# Patient Record
Sex: FEMALE | Race: WHITE | NOT HISPANIC OR LATINO | Employment: FULL TIME | URBAN - METROPOLITAN AREA
[De-identification: names, ages, dates, MRNs, and addresses within clinical notes are randomized per-mention and may not be internally consistent; named-entity substitution may affect disease eponyms.]

---

## 2019-12-03 ENCOUNTER — CLINICAL SUPPORT (OUTPATIENT)
Dept: GASTROENTEROLOGY | Facility: CLINIC | Age: 64
End: 2019-12-03

## 2019-12-03 VITALS — BODY MASS INDEX: 27.6 KG/M2 | WEIGHT: 150 LBS | HEIGHT: 62 IN

## 2019-12-03 DIAGNOSIS — Z86.010 HISTORY OF COLONIC POLYPS: Primary | ICD-10-CM

## 2019-12-03 RX ORDER — FLUTICASONE PROPIONATE 50 MCG
1 SPRAY, SUSPENSION (ML) NASAL DAILY
COMMUNITY

## 2019-12-03 RX ORDER — ASPIRIN 81 MG/1
81 TABLET ORAL DAILY
COMMUNITY

## 2019-12-03 NOTE — PROGRESS NOTES
Pt seen for colon prep dx history of polyps med's reviewed instructions given for clenpiq sample given to patient

## 2019-12-05 ENCOUNTER — TELEPHONE (OUTPATIENT)
Dept: GASTROENTEROLOGY | Facility: CLINIC | Age: 64
End: 2019-12-05

## 2019-12-05 NOTE — TELEPHONE ENCOUNTER
Received call from pt pharmacy  Clenpiq not available still  Called in Plenvu $0 copay  LVM for pt that we called in a new prep kit for her  Asked her to call us back to review new instructions and to find out if it is ok to e-mail her the new prep instructions

## 2019-12-09 PROCEDURE — 88305 TISSUE EXAM BY PATHOLOGIST: CPT | Performed by: PATHOLOGY

## 2019-12-10 ENCOUNTER — LAB REQUISITION (OUTPATIENT)
Dept: LAB | Facility: HOSPITAL | Age: 64
End: 2019-12-10
Payer: COMMERCIAL

## 2019-12-10 DIAGNOSIS — Z83.71 FAMILY HISTORY OF COLONIC POLYPS: ICD-10-CM

## 2019-12-10 DIAGNOSIS — Z12.11 ENCOUNTER FOR SCREENING FOR MALIGNANT NEOPLASM OF COLON: ICD-10-CM

## 2019-12-10 DIAGNOSIS — K62.1 RECTAL POLYP: ICD-10-CM

## 2019-12-10 DIAGNOSIS — K64.0 FIRST DEGREE HEMORRHOIDS: ICD-10-CM

## 2019-12-10 DIAGNOSIS — K57.30 DIVERTICULOSIS OF LARGE INTESTINE WITHOUT PERFORATION OR ABSCESS WITHOUT BLEEDING: ICD-10-CM

## 2020-07-23 ENCOUNTER — TELEPHONE (OUTPATIENT)
Dept: OBGYN CLINIC | Facility: CLINIC | Age: 65
End: 2020-07-23

## 2020-07-27 ENCOUNTER — OFFICE VISIT (OUTPATIENT)
Dept: OBGYN CLINIC | Facility: CLINIC | Age: 65
End: 2020-07-27
Payer: COMMERCIAL

## 2020-07-27 VITALS — BODY MASS INDEX: 28.58 KG/M2 | HEIGHT: 61 IN

## 2020-07-27 DIAGNOSIS — R93.89 THICKENED ENDOMETRIUM: Primary | ICD-10-CM

## 2020-07-27 DIAGNOSIS — Z12.31 ENCOUNTER FOR SCREENING MAMMOGRAM FOR MALIGNANT NEOPLASM OF BREAST: ICD-10-CM

## 2020-07-27 DIAGNOSIS — N88.2 STENOTIC CERVICAL OS: ICD-10-CM

## 2020-07-27 DIAGNOSIS — M81.0 AGE-RELATED OSTEOPOROSIS WITHOUT CURRENT PATHOLOGICAL FRACTURE: ICD-10-CM

## 2020-07-27 PROCEDURE — 99203 OFFICE O/P NEW LOW 30 MIN: CPT | Performed by: OBSTETRICS & GYNECOLOGY

## 2020-07-27 RX ORDER — ACETAMINOPHEN 160 MG
TABLET,DISINTEGRATING ORAL EVERY 24 HOURS
COMMUNITY

## 2020-07-27 NOTE — PROGRESS NOTES
CC:  Endometrial thickening    HPI: Malina Kraft presents for evaluation of endometrial thickening  This patient who was seen at another gyn office several months ago was found to have endometrial thickening  She underwent an endometrial biopsy with only cervical tissue obtained  The physician there recommended a formal D&C but the patient did not wish to undergo surgery  Instead, a subtle upon a repeat biopsy 6 months later  The patient is now here for this biopsy since her previous gyn is no longer available  Historically, the patient had 1 prior D&C and apparently a LEEP cone procedure  She had a Pap smear last year that was normal and she denies any active spotting or bleeding at this time  Past Medical History:  No past medical history on file  Past Surgical History:  Past Surgical History:   Procedure Laterality Date     SECTION      CHOLECYSTECTOMY      COLONOSCOPY  2014    DILATION AND CURETTAGE OF UTERUS      TUBAL LIGATION         Past OB/Gyn History:  Refer to HPI     ALLERGIES: No Known Allergies    MEDS:   Current Outpatient Medications:     aspirin (ECOTRIN LOW STRENGTH) 81 mg EC tablet    Cholecalciferol (VITAMIN D3) 50 MCG ( UT) capsule    fluticasone (FLONASE) 50 mcg/act nasal spray    Sod Picosulfate-Mag Ox-Cit Acd (CLENPIQ) 10-3 5-12 MG-GM -GM/160ML SOLN    Review of Systems:  Skin: No rashes or discolorations of any concern  RESP: Denies SOB, no cough  CV: Denies chest pain or palpitations  Breasts: Denies masses, pain, skin changes and nipple discharge  GI: Denies abdominal pain, heartburn, nausea, vomiting, changes in bowel habits  : Denies dysuria, frequency, CVA tenderness, incontinence and hematuria  Genitalia: Denies abnormal vaginal discharge, external lesions, rashes, pelvic pain, pressure, abnormal bleeding    Rectal:  Denies pain, bleeding, hemorrhoids,    Physical Exam:  Ht 5' 0 75" (1 543 m)   BMI 28 58 kg/m²    Gen: The patient was alert and oriented x3, pleasant well-appearing female in no acute distress  Heart:  Regular rate and rhythm without murmurs  Lungs:  Clear bilaterally without wheeze, rales or rhonchi  Abd:  Soft, nontender, nondistended, no masses or organomegaly  Back:  No CVA tenderness, no tenderness to palpation along spine  Pelvic  Normal appearing external female genitalia, no visible lesions, no rashes  Vagina is free of discharge, normal vaginal epithelium, no abnormal  lesions, no evidence of prolapse anteriorly or posteriorly  The cervix appears flush with a very stenotic os,, mobile and nontender  Uterus is atrophic in size, mobile and, nontender  No palpable adnexal masses or tenderness  No anoperineal lesions  Skin:  No concerning lesions  Extremeties: No edema      Assessment & Plan:   1  Endometrial thickening with a stenotic os  Advised the patient based on the visual exam that I would not even attempt an office biopsy given the small size of the cervical os and the history of the LEEP cone procedure  I reviewed the process of hysteroscopy and D&C with the patient and she agrees to the same  She had adequate opportunity to have her questions answered  She was personally consented by myself

## 2020-07-29 ENCOUNTER — TELEPHONE (OUTPATIENT)
Dept: OBGYN CLINIC | Facility: CLINIC | Age: 65
End: 2020-07-29

## 2020-07-31 ENCOUNTER — TRANSCRIBE ORDERS (OUTPATIENT)
Dept: ADMINISTRATIVE | Facility: HOSPITAL | Age: 65
End: 2020-07-31

## 2020-07-31 DIAGNOSIS — Z12.31 ENCOUNTER FOR SCREENING MAMMOGRAM FOR MALIGNANT NEOPLASM OF BREAST: Primary | ICD-10-CM

## 2020-08-11 ENCOUNTER — HOSPITAL ENCOUNTER (OUTPATIENT)
Dept: NON INVASIVE DIAGNOSTICS | Facility: CLINIC | Age: 65
Discharge: HOME | End: 2020-08-11
Payer: COMMERCIAL

## 2020-08-11 ENCOUNTER — HOSPITAL ENCOUNTER (OUTPATIENT)
Dept: MAMMOGRAPHY | Facility: CLINIC | Age: 65
Discharge: HOME | End: 2020-08-11
Payer: COMMERCIAL

## 2020-08-11 ENCOUNTER — HOSPITAL ENCOUNTER (OUTPATIENT)
Dept: BONE DENSITY | Facility: CLINIC | Age: 65
Discharge: HOME | End: 2020-08-11
Payer: COMMERCIAL

## 2020-08-11 ENCOUNTER — APPOINTMENT (OUTPATIENT)
Dept: LAB | Facility: CLINIC | Age: 65
End: 2020-08-11
Payer: COMMERCIAL

## 2020-08-11 VITALS — WEIGHT: 150 LBS | BODY MASS INDEX: 28.32 KG/M2 | HEIGHT: 61 IN

## 2020-08-11 DIAGNOSIS — Z01.818 PRE-OP TESTING: ICD-10-CM

## 2020-08-11 DIAGNOSIS — Z12.31 ENCOUNTER FOR SCREENING MAMMOGRAM FOR MALIGNANT NEOPLASM OF BREAST: ICD-10-CM

## 2020-08-11 DIAGNOSIS — M81.0 AGE-RELATED OSTEOPOROSIS WITHOUT CURRENT PATHOLOGICAL FRACTURE: ICD-10-CM

## 2020-08-11 LAB
APTT PPP: 28 SECONDS (ref 23–37)
ATRIAL RATE: 64 BPM
ERYTHROCYTE [DISTWIDTH] IN BLOOD BY AUTOMATED COUNT: 12.9 % (ref 11.6–15.1)
HCT VFR BLD AUTO: 40.8 % (ref 34.8–46.1)
HGB BLD-MCNC: 12.8 G/DL (ref 11.5–15.4)
INR PPP: 0.95 (ref 0.84–1.19)
MCH RBC QN AUTO: 29.9 PG (ref 26.8–34.3)
MCHC RBC AUTO-ENTMCNC: 31.4 G/DL (ref 31.4–37.4)
MCV RBC AUTO: 95 FL (ref 82–98)
P AXIS: 41 DEGREES
PLATELET # BLD AUTO: 280 THOUSANDS/UL (ref 149–390)
PMV BLD AUTO: 10.8 FL (ref 8.9–12.7)
PR INTERVAL: 154 MS
PROTHROMBIN TIME: 12.7 SECONDS (ref 11.6–14.5)
QRS AXIS: 2 DEGREES
QRSD INTERVAL: 80 MS
QT INTERVAL: 424 MS
QTC INTERVAL: 437 MS
RBC # BLD AUTO: 4.28 MILLION/UL (ref 3.81–5.12)
T WAVE AXIS: 6 DEGREES
VENTRICULAR RATE: 64 BPM
WBC # BLD AUTO: 5.88 THOUSAND/UL (ref 4.31–10.16)

## 2020-08-11 PROCEDURE — 85027 COMPLETE CBC AUTOMATED: CPT

## 2020-08-11 PROCEDURE — 36415 COLL VENOUS BLD VENIPUNCTURE: CPT

## 2020-08-11 PROCEDURE — 77067 SCR MAMMO BI INCL CAD: CPT

## 2020-08-11 PROCEDURE — 77080 DXA BONE DENSITY AXIAL: CPT

## 2020-08-11 PROCEDURE — 85730 THROMBOPLASTIN TIME PARTIAL: CPT

## 2020-08-11 PROCEDURE — 93010 ELECTROCARDIOGRAM REPORT: CPT | Performed by: INTERNAL MEDICINE

## 2020-08-11 PROCEDURE — 93005 ELECTROCARDIOGRAM TRACING: CPT

## 2020-08-11 PROCEDURE — 85610 PROTHROMBIN TIME: CPT

## 2020-08-11 PROCEDURE — 77063 BREAST TOMOSYNTHESIS BI: CPT

## 2020-08-21 NOTE — PRE-PROCEDURE INSTRUCTIONS
Pre-Surgery Instructions:   Medication Instructions    Ascorbic Acid (VITAMIN C PO) Instructed patient per Anesthesia Guidelines   aspirin (ECOTRIN LOW STRENGTH) 81 mg EC tablet Patient was instructed by Physician and understands   Cholecalciferol (VITAMIN D3) 50 MCG (2000 UT) capsule Instructed patient per Anesthesia Guidelines   CRANBERRY PO Instructed patient per Anesthesia Guidelines   Cyanocobalamin (VITAMIN B12 PO) Instructed patient per Anesthesia Guidelines   fluticasone (FLONASE) 50 mcg/act nasal spray Instructed patient per Anesthesia Guidelines   Pyridoxine HCl (VITAMIN B6 PO) Instructed patient per Anesthesia Guidelines  Pre-op Showering Instructions for Surgery Patients    Before your operation, you play an important role in decreasing your risk for infection by washing with special antiseptic soap  This is an effective way to reduce bacteria on the skin which may help to prevent infections at the surgical site  Please read the following directions in advance  1  In the week before your operation, purchase a 4 ounce bottle of antiseptic soap containing chlorhexidine gluconate (CHG)  4%  Some brand names include: Aplicare®, Endure, and Hibiclens®  The cost is usually less than $5 00   For your convenience, the [employer] carries the soap   It may also be available at your doctors office or pre-admission testing center, and at most retail pharmacies   If you are allergic or sensitive to soaps containing CHG, please let your doctor know so another antiseptic can be suggested   CHG antiseptic soap is for external use only  2   The day before your operation, follow these instructions carefully to get ready   Please clean linens (sheets) on your bed; you should sleep on clean sheets after your evening shower   Get clean towels and washcloth ready - you need enough for 2 showers   Set aside clean underwear, pajamas, and clothing to wear after the showers     Reminders:   DO NOT use any other soap or body rinse on your skin during or after the antiseptic showers   DO NOT use lotion, powder, deodorant, or perfume/aftershave of any kind on your skin after your antiseptic shower   DO NOT shave any body parts in the 24 hours/day before your operation   DO NOT get the antiseptic soap in your eyes, ears, nose, mouth, or vaginal area    3  You will need to shower the night before AND the morning of your surgery  Shower 1:   The first evening before the operation, take the first shower   First, shampoo your hair with regular shampoo and rinse it completely before you use the antiseptic soap  After washing and rinsing your hair, rinse your body   Next, use a clean washcloth to apply the antiseptic soap and wash your body from the neck down to your toes using ½ bottle of the antiseptic soap  You will use the other ½ bottle for the second shower   Clean the area where your incision will be; lather this area well for about 2 minutes   If you are having head or neck surgery, wash areas with the antiseptic soap   Rinse yourself completely with running water   Use a clean towel to dry off   Wear clean underwear and clothing/pajamas  Shower 2   The morning of your operation, take the second shower following the same steps as Shower 1 using the second ½ of the bottle of antiseptic soap   Use clean cloths and towels to wash and dry yourself   Wear clean underwear and clothing    You will receive a phone call from hospital for arrival time  Please call surgeons office if any changes in your condition  Wear easy on/off clothing; consider type of surgery;  valuables and jewelry please keep at home  **COVID-19  education done  Please: No contacts or eye make up or artificial eyelashes    Please bring special ordered sling or braces if needed for  Your particular surgery   n/a    Please secure transportation     Follow pre surgery showering or cleaning instructions as  Reviewed by nurse or surgeons office      Questions answered and concerns addressed

## 2020-08-31 ENCOUNTER — ANESTHESIA (OUTPATIENT)
Dept: PERIOP | Facility: HOSPITAL | Age: 65
End: 2020-08-31
Payer: COMMERCIAL

## 2020-08-31 ENCOUNTER — HOSPITAL ENCOUNTER (OUTPATIENT)
Facility: HOSPITAL | Age: 65
Setting detail: OUTPATIENT SURGERY
Discharge: HOME | End: 2020-08-31
Attending: OBSTETRICS & GYNECOLOGY | Admitting: OBSTETRICS & GYNECOLOGY
Payer: COMMERCIAL

## 2020-08-31 ENCOUNTER — ANESTHESIA EVENT (OUTPATIENT)
Dept: PERIOP | Facility: HOSPITAL | Age: 65
End: 2020-08-31
Payer: COMMERCIAL

## 2020-08-31 VITALS
BODY MASS INDEX: 27.38 KG/M2 | WEIGHT: 145 LBS | TEMPERATURE: 97.8 F | HEIGHT: 61 IN | HEART RATE: 73 BPM | RESPIRATION RATE: 16 BRPM | DIASTOLIC BLOOD PRESSURE: 64 MMHG | OXYGEN SATURATION: 100 % | SYSTOLIC BLOOD PRESSURE: 122 MMHG

## 2020-08-31 DIAGNOSIS — R93.89 ENDOMETRIAL THICKENING ON ULTRASOUND: ICD-10-CM

## 2020-08-31 PROCEDURE — 88305 TISSUE EXAM BY PATHOLOGIST: CPT | Performed by: PATHOLOGY

## 2020-08-31 PROCEDURE — 58558 HYSTEROSCOPY BIOPSY: CPT | Performed by: OBSTETRICS & GYNECOLOGY

## 2020-08-31 PROCEDURE — 99024 POSTOP FOLLOW-UP VISIT: CPT | Performed by: OBSTETRICS & GYNECOLOGY

## 2020-08-31 RX ORDER — FENTANYL CITRATE/PF 50 MCG/ML
25 SYRINGE (ML) INJECTION
Status: DISCONTINUED | OUTPATIENT
Start: 2020-08-31 | End: 2020-08-31 | Stop reason: HOSPADM

## 2020-08-31 RX ORDER — IBUPROFEN 600 MG/1
600 TABLET ORAL EVERY 6 HOURS PRN
Status: DISCONTINUED | OUTPATIENT
Start: 2020-08-31 | End: 2020-08-31 | Stop reason: HOSPADM

## 2020-08-31 RX ORDER — ONDANSETRON 2 MG/ML
4 INJECTION INTRAMUSCULAR; INTRAVENOUS ONCE AS NEEDED
Status: DISCONTINUED | OUTPATIENT
Start: 2020-08-31 | End: 2020-08-31 | Stop reason: HOSPADM

## 2020-08-31 RX ORDER — MEPERIDINE HYDROCHLORIDE 25 MG/ML
12.5 INJECTION INTRAMUSCULAR; INTRAVENOUS; SUBCUTANEOUS
Status: DISCONTINUED | OUTPATIENT
Start: 2020-08-31 | End: 2020-08-31 | Stop reason: HOSPADM

## 2020-08-31 RX ORDER — FENTANYL CITRATE 50 UG/ML
INJECTION, SOLUTION INTRAMUSCULAR; INTRAVENOUS AS NEEDED
Status: DISCONTINUED | OUTPATIENT
Start: 2020-08-31 | End: 2020-08-31

## 2020-08-31 RX ORDER — SODIUM CHLORIDE, SODIUM LACTATE, POTASSIUM CHLORIDE, CALCIUM CHLORIDE 600; 310; 30; 20 MG/100ML; MG/100ML; MG/100ML; MG/100ML
75 INJECTION, SOLUTION INTRAVENOUS CONTINUOUS
Status: DISCONTINUED | OUTPATIENT
Start: 2020-08-31 | End: 2020-08-31 | Stop reason: HOSPADM

## 2020-08-31 RX ORDER — METOCLOPRAMIDE HYDROCHLORIDE 5 MG/ML
10 INJECTION INTRAMUSCULAR; INTRAVENOUS ONCE AS NEEDED
Status: DISCONTINUED | OUTPATIENT
Start: 2020-08-31 | End: 2020-08-31 | Stop reason: HOSPADM

## 2020-08-31 RX ORDER — HYDROMORPHONE HCL/PF 1 MG/ML
0.5 SYRINGE (ML) INJECTION
Status: DISCONTINUED | OUTPATIENT
Start: 2020-08-31 | End: 2020-08-31 | Stop reason: HOSPADM

## 2020-08-31 RX ORDER — DEXAMETHASONE SODIUM PHOSPHATE 10 MG/ML
INJECTION, SOLUTION INTRAMUSCULAR; INTRAVENOUS AS NEEDED
Status: DISCONTINUED | OUTPATIENT
Start: 2020-08-31 | End: 2020-08-31

## 2020-08-31 RX ORDER — MAGNESIUM HYDROXIDE 1200 MG/15ML
LIQUID ORAL AS NEEDED
Status: DISCONTINUED | OUTPATIENT
Start: 2020-08-31 | End: 2020-08-31 | Stop reason: HOSPADM

## 2020-08-31 RX ORDER — PROPOFOL 10 MG/ML
INJECTION, EMULSION INTRAVENOUS AS NEEDED
Status: DISCONTINUED | OUTPATIENT
Start: 2020-08-31 | End: 2020-08-31

## 2020-08-31 RX ORDER — ONDANSETRON 2 MG/ML
INJECTION INTRAMUSCULAR; INTRAVENOUS AS NEEDED
Status: DISCONTINUED | OUTPATIENT
Start: 2020-08-31 | End: 2020-08-31

## 2020-08-31 RX ORDER — MIDAZOLAM HYDROCHLORIDE 2 MG/2ML
INJECTION, SOLUTION INTRAMUSCULAR; INTRAVENOUS AS NEEDED
Status: DISCONTINUED | OUTPATIENT
Start: 2020-08-31 | End: 2020-08-31

## 2020-08-31 RX ADMIN — PROPOFOL 200 MG: 10 INJECTION, EMULSION INTRAVENOUS at 08:50

## 2020-08-31 RX ADMIN — FENTANYL CITRATE 25 MCG: 50 INJECTION, SOLUTION INTRAMUSCULAR; INTRAVENOUS at 09:25

## 2020-08-31 RX ADMIN — ONDANSETRON 4 MG: 2 INJECTION INTRAMUSCULAR; INTRAVENOUS at 09:05

## 2020-08-31 RX ADMIN — DEXAMETHASONE SODIUM PHOSPHATE 4 MG: 10 INJECTION, SOLUTION INTRAMUSCULAR; INTRAVENOUS at 09:00

## 2020-08-31 RX ADMIN — FENTANYL CITRATE 100 MCG: 50 INJECTION, SOLUTION INTRAMUSCULAR; INTRAVENOUS at 08:43

## 2020-08-31 RX ADMIN — SODIUM CHLORIDE, SODIUM LACTATE, POTASSIUM CHLORIDE, AND CALCIUM CHLORIDE 75 ML/HR: .6; .31; .03; .02 INJECTION, SOLUTION INTRAVENOUS at 07:55

## 2020-08-31 RX ADMIN — FENTANYL CITRATE 25 MCG: 50 INJECTION, SOLUTION INTRAMUSCULAR; INTRAVENOUS at 09:31

## 2020-08-31 RX ADMIN — MIDAZOLAM 2 MG: 1 INJECTION INTRAMUSCULAR; INTRAVENOUS at 08:43

## 2020-08-31 NOTE — H&P
H&P Exam - Gynecology   Roberto Knowles 72 y o  female MRN: [id number]  Unit/Bed#: OR [place] Encounter: [id number]    CC:  Endometrial thickening      History of Present Illness   HPI:  Roberto Knowles is a 72 y o  female who presents with hysteroscopy with D&C because of endometrial thickening  This patient who was seen at another gyn office several months ago was found to have endometrial thickening  She underwent an endometrial biopsy with only cervical tissue obtained  The physician there recommended a formal D&C but the patient did not wish to undergo surgery  Instead, a subtle upon a repeat biopsy 6 months later  The patient is now here for this biopsy since her previous gyn is no longer available  Historically, the patient had 1 prior D&C and apparently a LEEP cone procedure  She had a Pap smear last year that was normal and she denies any active spotting or bleeding at this time  Historical Information   History reviewed  No pertinent past medical history    Past Surgical History:   Procedure Laterality Date     SECTION      CHOLECYSTECTOMY      COLONOSCOPY  2014    DILATION AND CURETTAGE OF UTERUS      TUBAL LIGATION       OB/GYN History:  Noncontributory  Family History   Problem Relation Age of Onset    Hypertension Mother     Heart disease Mother     Thyroid disease Mother     Liver disease Mother     Cancer Father         lung cancer    No Known Problems Daughter     No Known Problems Maternal Grandmother     No Known Problems Maternal Grandfather     No Known Problems Paternal Grandmother     No Known Problems Paternal Grandfather     No Known Problems Maternal Aunt     No Known Problems Maternal Aunt     No Known Problems Paternal Aunt      Social History   Social History     Substance and Sexual Activity   Alcohol Use Yes    Comment: 1-2 glasses per wine     Social History     Substance and Sexual Activity   Drug Use Never     Social History     Tobacco Use   Smoking Status Former Smoker    Types: Cigarettes   Smokeless Tobacco Never Used   Tobacco Comment    quit march 1977       Meds/Allergies   Medications Prior to Admission   Medication    Ascorbic Acid (VITAMIN C PO)    aspirin (ECOTRIN LOW STRENGTH) 81 mg EC tablet    Cholecalciferol (VITAMIN D3) 50 MCG (2000 UT) capsule    CRANBERRY PO    Cyanocobalamin (VITAMIN B12 PO)    fluticasone (FLONASE) 50 mcg/act nasal spray    Pyridoxine HCl (VITAMIN B6 PO)     No Known Allergies    Objective   Vitals: Blood pressure (!) 177/77, pulse 67, temperature 98 2 °F (36 8 °C), temperature source Oral, resp  rate 16, height 5' 1" (1 549 m), weight 65 8 kg (145 lb), SpO2 98 %  No intake or output data in the 24 hours ending 08/31/20 0814    Review of Systems:  All negative    Physical Exam:  General:  Adult female who is awake, alert and in no apparent distress, oriented x3  VS: BP (!) 177/77   Pulse 67   Temp 98 2 °F (36 8 °C) (Oral)   Resp 16   Ht 5' 1" (1 549 m)   Wt 65 8 kg (145 lb)   SpO2 98%   BMI 27 40 kg/m²   Lungs:  Clear bilaterally that wheezing  Heart:  Regular rate and rhythm without murmurs  Abdomen:  Soft, flat, nontender without guarding or rebound  No masses  Gyn:  Normal external genitalia  Vagina is of normal length and caliber  Cervix grossly normal mobile nontender  Uterus anteverted normal size shape consistency no adnexal masses or tenderness  Extremities:  No clubbing, cyanosis, deformity, signs of DVT or swelling    Lab Results:   No visits with results within 1 Day(s) from this visit     Latest known visit with results is:   Appointment on 08/11/2020   Component Date Value    WBC 08/11/2020 5 88     RBC 08/11/2020 4 28     Hemoglobin 08/11/2020 12 8     Hematocrit 08/11/2020 40 8     MCV 08/11/2020 95     MCH 08/11/2020 29 9     MCHC 08/11/2020 31 4     RDW 08/11/2020 12 9     Platelets 59/89/3462 280     MPV 08/11/2020 10 8     Protime 08/11/2020 12 7     INR 08/11/2020 0 95  PTT 08/11/2020 28         Assessment/Plan     Assessment:  Endometrial thickening  Plan:  Hysteroscopy with D&C

## 2020-08-31 NOTE — OP NOTE
OPERATIVE REPORT  PATIENT NAME: Cheryl Cheung    :  1955  MRN: [id number]  Pt Location: [facility] OR ROOM 03    SURGERY DATE: 2020    Surgeon(s) and Role:     Mike Espinoza MD - Primary    Preop Diagnosis:  Endometrial thickening on ultrasound [R93 89]    Post-Op Diagnosis Codes:     * Endometrial thickening on ultrasound [R93 89]    Procedure(s) (LRB):  DILATATION AND CURETTAGE (D&C) WITH HYSTEROSCOPY (N/A)    Specimen(s):  ID Type Source Tests Collected by Time Destination   1 :  Tissue Endometrium TISSUE EXAM Blanche Betancourt MD 2020 [id number]        Estimated Blood Loss:   Minimal    Drains:  * No LDAs found *    Anesthesia Type:   General    Operative Indications:  Endometrial thickening on ultrasound [R93 89]      Operative Findings:    Anteverted uterus sounded to 7 cm with a stenotic os  Hysteroscopic observation showed a very clean uterine cavity that was very atrophic with bilateral ostia visualized  Very little tissue was obtained on curetting  Complications:   None    Procedure and Technique:    Having  Identified the patient as Brittany Sarkar on the operating room table, the patient was placed under general anesthesia in carefully positioned in the dorsal lithotomy position  After the patient was prepped and draped usual fashion, a time-out was obtained  The bladder was drained for approximately 50 cc of clear yellow urine followed by placement of weighted speculum the vaginal vault to visualize  The cervix  The anterior lip of the cervix was grasped with a single-tooth tenaculum for purposes of traction  It was visibly stenotic  However, using lacrimal dilators, the endometrial canal was easily gotten and after easy dilation to a size 16 [employer] Geetha dilator, hysteroscopy was performed    The standard 25 degree hysteroscope using saline as distending media showed a clean endocervical canal   Advancement to the uterus showed diffuse atrophy with no obvious pathology along with both ostia being visualized  After photo documentation the hysteroscope was withdrawn  Gentle sharp curettage produced very little tissue  At the conclusion the procedure all sponge instrument counts coincided with the preoperative count  The patient was taken the PACU in satisfactory condition     I was present for the entire procedure and A qualified resident physician was not available    Patient Disposition:  PACU     SIGNATURE: Jeanette Berkowitz MD  DATE: August 31, 2020  TIME: 12:24 PM

## 2020-08-31 NOTE — ANESTHESIA PREPROCEDURE EVALUATION
Procedure:  DILATATION AND CURETTAGE (D&C) WITH HYSTEROSCOPY (N/A Uterus)    Relevant Problems   No relevant active problems        Physical Exam    Airway    Mallampati score: II  TM Distance: >3 FB  Neck ROM: full     Dental   No notable dental hx     Cardiovascular  Rhythm: regular, Rate: normal, Cardiovascular exam normal    Pulmonary  Pulmonary exam normal     Other Findings  Pt reports quite a few caps      Anesthesia Plan  ASA Score- 2     Anesthesia Type- general with ASA Monitors  Additional Monitors:   Airway Plan: LMA  Plan Factors-Exercise tolerance (METS): >4 METS  Chart reviewed  EKG reviewed  Patient is not a current smoker  Patient not instructed to abstain from smoking on day of procedure  Patient did not smoke on day of surgery  Obstructive sleep apnea risk education given perioperatively  Induction- intravenous  Postoperative Plan- Plan for postoperative opioid use  Informed Consent- Anesthetic plan and risks discussed with patient  I personally reviewed this patient with the CRNA  Discussed and agreed on the Anesthesia Plan with the CRNA  Danny Ortega

## 2020-08-31 NOTE — ANESTHESIA POSTPROCEDURE EVALUATION
Post-Op Assessment Note    CV Status:  Stable  Pain Score: 2    Pain management: adequate     Mental Status:  Alert and awake   Hydration Status:  Euvolemic   PONV Controlled:  Controlled   Airway Patency:  Patent      Post Op Vitals Reviewed: Yes      Staff: CRNA         No complications documented      BP     Temp      Pulse     Resp      SpO2

## 2020-08-31 NOTE — DISCHARGE INSTRUCTIONS
Dilation and Curettage   WHAT YOU SHOULD KNOW:   Dilation and curettage (D and C) is a procedure to remove tissue from the lining of your uterus  INSTRUCTIONS:   Medicines:   · Pain medicine: You may be given medicine to take away or decrease pain  Do not wait until the pain is severe before you take your medicine  · Antibiotics: This medicine will help fight or prevent an infection  · Take your medicine as directed  Call your healthcare provider if you think your medicine is not helping or if you have side effects  Tell him if you are allergic to any medicine  Keep a list of the medicines, vitamins, and herbs you take  Include the amounts, and when and why you take them  Bring the list or the pill bottles to follow-up visits  Carry your medicine list with you in case of an emergency  Follow up with your healthcare provider as directed:  Write down your questions so you remember to ask them during your visits  Activity:  Ask your primary healthcare provider when you can return to your normal activities  Contact your primary healthcare provider if:   · You have foul-smelling vaginal discharge  · You do not get your monthly period  · You feel depressed or anxious  · You feel very tired and weak  · You have questions or concerns about your condition or care  Return to the emergency department if:   · You have heavy vaginal bleeding that soaks 1 pad in 1 hour for 2 hours in a row  · You have a fever and abdominal cramps  · Your pain does not get better, even after treatment  © 2014 [employer] is for End User's use only and may not be sold, redistributed or otherwise used for commercial purposes  All illustrations and images included in CareNotes® are the copyrighted property of A D A M , Inc  or [employer]  The above information is an  only  It is not intended as medical advice for individual conditions or treatments   Talk to your doctor, nurse or pharmacist before following any medical regimen to see if it is safe and effective for you

## 2021-03-04 DIAGNOSIS — Z23 ENCOUNTER FOR IMMUNIZATION: ICD-10-CM

## 2021-03-09 ENCOUNTER — IMMUNIZATIONS (OUTPATIENT)
Dept: FAMILY MEDICINE CLINIC | Facility: HOSPITAL | Age: 66
End: 2021-03-09

## 2021-03-09 DIAGNOSIS — Z23 ENCOUNTER FOR IMMUNIZATION: Primary | ICD-10-CM

## 2021-03-09 PROCEDURE — 91300 SARS-COV-2 / COVID-19 MRNA VACCINE (PFIZER-BIONTECH) 30 MCG: CPT

## 2021-03-09 PROCEDURE — 0001A SARS-COV-2 / COVID-19 MRNA VACCINE (PFIZER-BIONTECH) 30 MCG: CPT

## 2021-03-30 ENCOUNTER — IMMUNIZATIONS (OUTPATIENT)
Dept: FAMILY MEDICINE CLINIC | Facility: HOSPITAL | Age: 66
End: 2021-03-30

## 2021-03-30 DIAGNOSIS — Z23 ENCOUNTER FOR IMMUNIZATION: Primary | ICD-10-CM

## 2021-03-30 PROCEDURE — 0002A SARS-COV-2 / COVID-19 MRNA VACCINE (PFIZER-BIONTECH) 30 MCG: CPT

## 2021-03-30 PROCEDURE — 91300 SARS-COV-2 / COVID-19 MRNA VACCINE (PFIZER-BIONTECH) 30 MCG: CPT

## 2021-08-23 ENCOUNTER — ANNUAL EXAM (OUTPATIENT)
Dept: OBGYN CLINIC | Facility: CLINIC | Age: 66
End: 2021-08-23
Payer: COMMERCIAL

## 2021-08-23 VITALS
SYSTOLIC BLOOD PRESSURE: 120 MMHG | HEIGHT: 61 IN | WEIGHT: 149.8 LBS | DIASTOLIC BLOOD PRESSURE: 74 MMHG | BODY MASS INDEX: 28.28 KG/M2

## 2021-08-23 DIAGNOSIS — Z01.419 ENCOUNTER FOR GYNECOLOGICAL EXAMINATION WITHOUT ABNORMAL FINDING: Primary | ICD-10-CM

## 2021-08-23 DIAGNOSIS — Z12.31 ENCOUNTER FOR SCREENING MAMMOGRAM FOR MALIGNANT NEOPLASM OF BREAST: ICD-10-CM

## 2021-08-23 PROCEDURE — 1160F RVW MEDS BY RX/DR IN RCRD: CPT | Performed by: OBSTETRICS & GYNECOLOGY

## 2021-08-23 PROCEDURE — 1036F TOBACCO NON-USER: CPT | Performed by: OBSTETRICS & GYNECOLOGY

## 2021-08-23 PROCEDURE — 3008F BODY MASS INDEX DOCD: CPT | Performed by: OBSTETRICS & GYNECOLOGY

## 2021-08-23 PROCEDURE — 99387 INIT PM E/M NEW PAT 65+ YRS: CPT | Performed by: OBSTETRICS & GYNECOLOGY

## 2021-08-23 NOTE — PROGRESS NOTES
CC:  Annual exam    HPI: Vickie Anderson presents for routine gyn exam   Kaz Lunch is doing well and expresses no complaints or concerns at this time  Past Medical History:  History reviewed  No pertinent past medical history  Past Surgical History:  Past Surgical History:   Procedure Laterality Date     SECTION      CHOLECYSTECTOMY      COLONOSCOPY  2014    DILATION AND CURETTAGE OF UTERUS      OK HYSTEROSCOPY,W/ENDO BX N/A 2020    Procedure: DILATATION AND CURETTAGE (D&C) WITH HYSTEROSCOPY;  Surgeon: William Brittle, MD;  Location:  MAIN OR;  Service: Gynecology    TUBAL LIGATION         Past OB/Gyn History:    Patient is menopausal   Denies any history of sexually transmitted infection  No history of abnormal pap smears   Her last pap smear was  and normal     ALLERGIES: No Known Allergies    MEDS:   Current Outpatient Medications:     Ascorbic Acid (VITAMIN C PO)    aspirin (ECOTRIN LOW STRENGTH) 81 mg EC tablet    Cholecalciferol (VITAMIN D3) 50 MCG (2000) capsule    CRANBERRY PO    Cyanocobalamin (VITAMIN B12 PO)    fluticasone (FLONASE) 50 mcg/act nasal spray    Pyridoxine HCl (VITAMIN B6 PO)    Family History:  Family History   Problem Relation Age of Onset    Hypertension Mother     Heart disease Mother     Thyroid disease Mother     Liver disease Mother     Cancer Father         lung cancer    No Known Problems Daughter     No Known Problems Maternal Grandmother     No Known Problems Maternal Grandfather     No Known Problems Paternal Grandmother     No Known Problems Paternal Grandfather     No Known Problems Maternal Aunt     No Known Problems Maternal Aunt     No Known Problems Paternal Aunt        Social History:  Social History     Socioeconomic History    Marital status: Single     Spouse name: Not on file    Number of children: Not on file    Years of education: Not on file    Highest education level: Not on file   Occupational History    Not on file   Tobacco Use    Smoking status: Former Smoker     Types: Cigarettes    Smokeless tobacco: Never Used    Tobacco comment: quit march 1977   Vaping Use    Vaping Use: Never used   Substance and Sexual Activity    Alcohol use: Yes     Comment: 1-2 glasses per wine    Drug use: Never    Sexual activity: Not on file   Other Topics Concern    Not on file   Social History Narrative    Not on file     Social Determinants of Health     Financial Resource Strain:     Difficulty of Paying Living Expenses:    Food Insecurity:     Worried About Running Out of Food in the Last Year:     [address] in the Last Year:    Transportation Needs:     Lack of Transportation (Medical):  Lack of Transportation (Non-Medical):    Physical Activity:     Days of Exercise per Week:     Minutes of Exercise per Session:    Stress:     Feeling of Stress :    Social Connections:     Frequency of Communication with Friends and Family:     Frequency of Social Gatherings with Friends and Family:     Attends Muslim Services:     Active Member of Clubs or Organizations:     Attends Club or Organization Meetings:     Marital Status:    Intimate Partner Violence:     Fear of Current or Ex-Partner:     Emotionally Abused:     Physically Abused:     Sexually Abused:          Review of Systems:  Gen:   Denies fatigue, chills, nausea, vomiting, fever  Skin: No rashes or discolorations of any concern  RESP: Denies SOB, no cough  CV: Denies chest pain or palpitations  Breasts: Denies masses, pain, skin changes and nipple discharge  GI: Denies abdominal pain, heartburn, nausea, vomiting, changes in bowel habits  : Denies dysuria, frequency, CVA tenderness, incontinence and hematuria  Genitalia: Denies abnormal vaginal discharge, external lesions, rashes, pelvic pain, pressure, abnormal bleeding    Rectal:  Denies pain, bleeding, hemorrhoids,    Physical Exam:  /74 (BP Location: Left arm, Patient Position: Sitting, Cuff Size: Standard)   Ht 5' 1" (1 549 m)   Wt 67 9 kg (149 lb 12 8 oz)   BMI 28 30 kg/m²    Gen: The patient was alert and oriented x3, pleasant well-appearing female in no acute distress  Neck:  Unremarkable, no lymphadenopathy, no thyromegaly, or tenderness  CV:  RRR, no murmurs  Resp:  Clear to auscultation bilaterally, no wheezing  Breasts: Symmetric  No dominant, discrete, fixed  or suspicious masses are noted  No skin or nipple changes  No palpable axillary nodes  No supraclavicular adenopathy  Abd:  Soft, nontender, nondistended, no masses or organomegaly  Back:  No CVA tenderness, no tenderness to palpation along spine  Pelvic:  Normal appearing external female genitalia, no visible lesions, no rashes  Vagina is free of discharge, normal vaginal epithelium, no abnormal  lesions, no evidence of prolapse anteriorly or posteriorly  Normal appearing cervix, mobile and nontender  A thin prep pap smear was not obtained today  Uterus is normal size, mobile and, nontender  No palpable adnexal masses or tenderness  No anoperineal lesions  Rectal:  No masses, tenderness, hemorrhoids, or obvious blood  Skin:  No concerning lesions  Extremeties: No edema      Assessment & Plan:   1  Routine annual exam      RTO one year orPRN  2  Encounter for screening mammogram, referral for mammogram given to patient

## 2021-11-03 ENCOUNTER — HOSPITAL ENCOUNTER (OUTPATIENT)
Dept: MAMMOGRAPHY | Facility: CLINIC | Age: 66
Discharge: HOME | End: 2021-11-03
Payer: COMMERCIAL

## 2021-11-03 VITALS — BODY MASS INDEX: 26.87 KG/M2 | HEIGHT: 62 IN | WEIGHT: 146 LBS

## 2021-11-03 DIAGNOSIS — Z12.31 ENCOUNTER FOR SCREENING MAMMOGRAM FOR MALIGNANT NEOPLASM OF BREAST: ICD-10-CM

## 2021-11-03 PROCEDURE — 77067 SCR MAMMO BI INCL CAD: CPT

## 2021-11-03 PROCEDURE — 77063 BREAST TOMOSYNTHESIS BI: CPT

## 2022-11-14 ENCOUNTER — ANNUAL EXAM (OUTPATIENT)
Dept: GYNECOLOGY | Facility: CLINIC | Age: 67
End: 2022-11-14

## 2022-11-14 VITALS
BODY MASS INDEX: 28.51 KG/M2 | DIASTOLIC BLOOD PRESSURE: 78 MMHG | WEIGHT: 145.2 LBS | SYSTOLIC BLOOD PRESSURE: 126 MMHG | HEIGHT: 60 IN

## 2022-11-14 DIAGNOSIS — Z11.51 SCREENING FOR HPV (HUMAN PAPILLOMAVIRUS): ICD-10-CM

## 2022-11-14 DIAGNOSIS — Z01.419 ENCOUNTER FOR ANNUAL ROUTINE GYNECOLOGICAL EXAMINATION: Primary | ICD-10-CM

## 2022-11-14 DIAGNOSIS — Z12.31 ENCOUNTER FOR SCREENING MAMMOGRAM FOR BREAST CANCER: ICD-10-CM

## 2022-11-14 DIAGNOSIS — M85.852 OSTEOPENIA OF NECK OF LEFT FEMUR: ICD-10-CM

## 2022-11-14 NOTE — PROGRESS NOTES
Assessment/Plan:    pap and HPV done - reviewed guidelines, she is unsure of the type of dysplasia that she had    mammogram reviewed with her including breast density  RX given and she will schedule    Discussed self breast exams    colon cancer screening - colonoscopy done in 2019, recall in 5 years    Osteopenia - discussed exercise, DEXA ordered    discussed preventive care, regular exercise and a healthy diet      No problem-specific Assessment & Plan notes found for this encounter  Diagnoses and all orders for this visit:    Encounter for annual routine gynecological examination    Encounter for screening mammogram for breast cancer  -     Mammo screening bilateral w 3d & cad; Future    Osteopenia of neck of left femur  -     DXA bone density spine hip and pelvis; Future          Subjective:      Patient ID: Cheryl Cheung is a 79 y o  female  Pt here for yearly  She has occasional breast tenderness that is intermittent  Normal pap in 2019  Normal 3D mammogram last November with fatty tissue and average risk  H/o LEEP many years ago, done when she was in her mid 45s  Her paps have been normal since then  DEXA from 2020 with osteopenia in her hip and femoral neck      The following portions of the patient's history were reviewed and updated as appropriate: allergies, current medications, past family history, past medical history, past social history, past surgical history and problem list     Review of Systems   Constitutional: Negative  Gastrointestinal: Negative  Genitourinary: Negative  Objective:      /78 (BP Location: Left arm, Patient Position: Sitting, Cuff Size: Standard)   Ht 5' 0 25" (1 53 m)   Wt 65 9 kg (145 lb 3 2 oz)   BMI 28 12 kg/m²          Physical Exam  Vitals reviewed  Constitutional:       Appearance: She is well-developed  Neck:      Thyroid: No thyromegaly  Trachea: No tracheal deviation     Cardiovascular:      Rate and Rhythm: Normal rate and regular rhythm  Pulmonary:      Effort: Pulmonary effort is normal       Breath sounds: Normal breath sounds  Chest:   Breasts: Breasts are symmetrical       Right: No inverted nipple, mass, nipple discharge, skin change or tenderness  Left: No inverted nipple, mass, nipple discharge, skin change or tenderness  Abdominal:      General: There is no distension  Palpations: Abdomen is soft  There is no mass  Tenderness: There is no abdominal tenderness  Genitourinary:     Labia:         Right: No rash, tenderness, lesion or injury  Left: No rash, tenderness, lesion or injury  Vagina: Normal       Cervix: No cervical motion tenderness, discharge or friability  Adnexa:         Right: No mass, tenderness or fullness  Left: No mass, tenderness or fullness          Rectum: Normal

## 2022-11-17 LAB
HPV HR 12 DNA CVX QL NAA+PROBE: NEGATIVE
HPV16 DNA CVX QL NAA+PROBE: NEGATIVE
HPV18 DNA CVX QL NAA+PROBE: NEGATIVE

## 2022-11-23 LAB
LAB AP GYN PRIMARY INTERPRETATION: NORMAL
Lab: NORMAL

## 2022-12-29 ENCOUNTER — HOSPITAL ENCOUNTER (OUTPATIENT)
Dept: MAMMOGRAPHY | Facility: CLINIC | Age: 67
Discharge: HOME | End: 2022-12-29

## 2022-12-29 ENCOUNTER — HOSPITAL ENCOUNTER (OUTPATIENT)
Dept: BONE DENSITY | Facility: CLINIC | Age: 67
Discharge: HOME | End: 2022-12-29

## 2022-12-29 VITALS — BODY MASS INDEX: 28.52 KG/M2 | WEIGHT: 145.28 LBS | HEIGHT: 60 IN

## 2022-12-29 VITALS — HEIGHT: 60 IN | BODY MASS INDEX: 28.52 KG/M2 | WEIGHT: 145.28 LBS

## 2022-12-29 DIAGNOSIS — M85.852 OSTEOPENIA OF NECK OF LEFT FEMUR: ICD-10-CM

## 2022-12-29 DIAGNOSIS — Z12.31 ENCOUNTER FOR SCREENING MAMMOGRAM FOR BREAST CANCER: ICD-10-CM

## 2023-10-10 DIAGNOSIS — Z12.31 VISIT FOR SCREENING MAMMOGRAM: Primary | ICD-10-CM

## 2023-12-30 ENCOUNTER — HOSPITAL ENCOUNTER (OUTPATIENT)
Dept: MAMMOGRAPHY | Facility: CLINIC | Age: 68
Discharge: HOME | End: 2023-12-30
Payer: COMMERCIAL

## 2023-12-30 VITALS — WEIGHT: 136.69 LBS | HEIGHT: 60 IN | BODY MASS INDEX: 26.84 KG/M2

## 2023-12-30 DIAGNOSIS — Z12.31 VISIT FOR SCREENING MAMMOGRAM: ICD-10-CM

## 2023-12-30 PROCEDURE — 77067 SCR MAMMO BI INCL CAD: CPT

## 2023-12-30 PROCEDURE — 77063 BREAST TOMOSYNTHESIS BI: CPT

## 2024-03-19 ENCOUNTER — ANNUAL EXAM (OUTPATIENT)
Dept: GYNECOLOGY | Facility: CLINIC | Age: 69
End: 2024-03-19
Payer: COMMERCIAL

## 2024-03-19 VITALS
WEIGHT: 151.2 LBS | DIASTOLIC BLOOD PRESSURE: 76 MMHG | BODY MASS INDEX: 29.68 KG/M2 | SYSTOLIC BLOOD PRESSURE: 138 MMHG | HEIGHT: 60 IN

## 2024-03-19 DIAGNOSIS — Z01.419 ENCOUNTER FOR ANNUAL ROUTINE GYNECOLOGICAL EXAMINATION: Primary | ICD-10-CM

## 2024-03-19 DIAGNOSIS — Z12.31 ENCOUNTER FOR SCREENING MAMMOGRAM FOR BREAST CANCER: ICD-10-CM

## 2024-03-19 DIAGNOSIS — M85.852 OSTEOPENIA OF NECK OF LEFT FEMUR: ICD-10-CM

## 2024-03-19 PROCEDURE — S0612 ANNUAL GYNECOLOGICAL EXAMINA: HCPCS | Performed by: OBSTETRICS & GYNECOLOGY

## 2024-03-19 NOTE — PROGRESS NOTES
Assessment/Plan:    pap is up to date    mammogram reviewed with her including breast density.  RX given for December  Her breast exam is normal.  If she has persistent breast pain or pain in 1 specific area, she should call  Discussed self breast exams    colon cancer screening-colonoscopy in 2019, she is due  at the end of the year    Osteopenia-we discussed strength training in addition to weightbearing exercise.  DEXA ordered for December.    discussed preventive care, regular exercise and a healthy diet    Condolences offered on the loss of her mother    No problem-specific Assessment & Plan notes found for this encounter.       Diagnoses and all orders for this visit:    Encounter for annual routine gynecological examination    Encounter for screening mammogram for breast cancer  -     Mammo screening bilateral w 3d & cad; Future    Osteopenia of neck of left femur  -     DXA bone density spine hip and pelvis; Future          Subjective:      Patient ID: Silvia Flores is a 68 y.o. female.    Patient here for yearly.  Last week she had some breast tenderness, it was bilateral and has resolved.  She does get it periodically    Normal 3D mammogram in December with fatty tissue and average risk  Normal Pap, negative HPV in November 2022  DEXA in December 2022 showed osteopenia at all 3 sites.  She did not meet treatment criteria    Her mother passed away in January        The following portions of the patient's history were reviewed and updated as appropriate: allergies, current medications, past family history, past medical history, past social history, past surgical history, and problem list.    Review of Systems   Constitutional: Negative.    Gastrointestinal: Negative.    Genitourinary: Negative.          Objective:      /76 (BP Location: Left arm, Patient Position: Sitting)   Ht 5' (1.524 m)   Wt 68.6 kg (151 lb 3.2 oz)   BMI 29.53 kg/m²          Physical Exam  Vitals reviewed.   Constitutional:        Appearance: Normal appearance. She is well-developed.   Neck:      Thyroid: No thyromegaly.      Trachea: No tracheal deviation.   Cardiovascular:      Rate and Rhythm: Normal rate and regular rhythm.   Pulmonary:      Effort: Pulmonary effort is normal.      Breath sounds: Normal breath sounds.   Chest:   Breasts:     Breasts are symmetrical.      Right: No inverted nipple, mass, nipple discharge, skin change or tenderness.      Left: No inverted nipple, mass, nipple discharge, skin change or tenderness.   Abdominal:      General: There is no distension.      Palpations: Abdomen is soft. There is no mass.      Tenderness: There is no abdominal tenderness.   Genitourinary:     Labia:         Right: No rash, tenderness, lesion or injury.         Left: No rash, tenderness, lesion or injury.       Vagina: Normal.      Cervix: Normal. No cervical motion tenderness, discharge or friability.      Uterus: Normal.       Adnexa: Right adnexa normal and left adnexa normal.        Right: No mass, tenderness or fullness.          Left: No mass, tenderness or fullness.        Rectum: Normal.   Neurological:      Mental Status: She is alert.

## 2024-08-12 ENCOUNTER — NURSE TRIAGE (OUTPATIENT)
Age: 69
End: 2024-08-12

## 2024-08-12 NOTE — TELEPHONE ENCOUNTER
"Silvia reports she has been having recurrent UTI with associated pelvic pain.  Reports has burning at end of stream- pelvic pain seems to increase at time of dysuria.  Has had work up with PCP who ruled out kidney stone.   Silvia states feels like having a spasm at urethra.    Advised to follow up with PCP who has initiated work up. Can offer gyn pelvic exam to rule out prolapse.   Next available urgent visit with Dr. Márquez in November.    Encouraged Silvia to contact urologist for evaluation due to report of feeling like spasm and recurrent UTI.  Provided [facility] phone #.   Contact PCP to f/u on recurrent UTI. IF develops severe pain, fever or back pain prior to appt recommend urgent care or ED for acute symptom.    Advised will forward to clerical staff and Dr. Márquez for GYN appt recommendation.    Reason for Disposition   [1] Pelvic pain is a chronic symptom (recurrent or ongoing AND [2] present > 4 weeks)    Answer Assessment - Initial Assessment Questions  1. LOCATION: \"Where does it hurt?\"       Pelvic pain, worse at end of urine stream  2. RADIATION: \"Does the pain shoot anywhere else?\" (e.g., lower back, groin, thighs)      denies  3. ONSET: \"When did the pain begin?\" (e.g., minutes, hours or days ago)       Whole month of june  4. SUDDEN: \"Gradual or sudden onset?\"      gradual  5. PATTERN \"Does the pain come and go, or is it constant?\"     - If constant: \"Is it getting better, staying the same, or worsening?\"       (Note: Constant means the pain never goes away completely; most serious pain is constant and gets worse over time)      - If intermittent: \"How long does it last?\" \"Do you have pain now?\"      (Note: Intermittent means the pain goes away completely between bouts)      Worse when completes urinating  6. SEVERITY: \"How bad is the pain?\"  (e.g., Scale 1-10; mild, moderate, or severe)    - MILD (1-3): doesn't interfere with normal activities, area soft and not tender to touch " "    - MODERATE (4-7): interferes with normal activities or awakens from sleep, tender to touch     - SEVERE (8-10): excruciating pain, doubled over, unable to do any normal activities       7 at worst  7. RECURRENT SYMPTOM: \"Have you ever had this type of pelvic pain before?\" If Yes, ask: \"When was the last time?\" and \"What happened that time?\"       Yes, when diagnosed with UTI  8. CAUSE: \"What do you think is causing the pelvic pain?\"      unsure  9. RELIEVING/AGGRAVATING FACTORS: \"What makes it better or worse?\" (e.g., activity/rest, sexual intercourse, voiding, passing stool)      Heating pad  10. OTHER SYMPTOMS: \"Has there been any other symptoms?\" (e.g., fever, vaginal bleeding, vaginal discharge, diarrhea, constipation, or voiding problems?\"        Pain at end of urinary stream  11. PREGNANCY: \"Is there any chance you are pregnant?\" \"When was your last menstrual period?\"        Post menopausal    Protocols used: Pelvic Pain - Female-ADULT-    "

## 2024-08-12 NOTE — TELEPHONE ENCOUNTER
Regarding: reoccurring UTI symptoms  ----- Message from Yaneth DIALLO sent at 8/12/2024  4:47 PM EDT -----  Pt called to report freq UTI's back in the month of June. Seen by PCP, who prescribed antibiotics. Pt cont'd with symptoms. Has since had an US, and cleared for kidney stones. Pt continues to report pelvic pain, denies pain during urination, however, reports pain when she finishes urinating. Continues to report discomfort and feeling like pressure. She is a pt of Dr. Márquez at [facility]     There are no Wet Read(s) to document.

## 2024-10-24 ENCOUNTER — PREP FOR PROCEDURE (OUTPATIENT)
Age: 69
End: 2024-10-24

## 2024-10-24 ENCOUNTER — TELEPHONE (OUTPATIENT)
Age: 69
End: 2024-10-24

## 2024-10-24 ENCOUNTER — PREP FOR PROCEDURE (OUTPATIENT)
Dept: GASTROENTEROLOGY | Facility: CLINIC | Age: 69
End: 2024-10-24

## 2024-10-24 DIAGNOSIS — Z12.11 SCREENING FOR COLON CANCER: Primary | ICD-10-CM

## 2024-10-24 NOTE — TELEPHONE ENCOUNTER
10/24/24  Screened by: Kerri Richardson    Referring Provider Dr. Malaika Quiñones    Pre- Screening:     Z12.11 (ICD-10-CM) - Screening for colon cancer     There is no height or weight on file to calculate BMI.28.3  Ht 5'1  Wt-150    Has patient been referred for a routine screening Colonoscopy? yes  Is the patient between 45-75 years old? yes      Previous Colonoscopy yes   If yes:    Date: 5 Years ago     Facility:     Reason:       Does the patient want to see a Gastroenterologist prior to their procedure OR are they having any GI symptoms? no    Has the patient been hospitalized or had abdominal surgery in the past 6 months? no    Does the patient use supplemental oxygen? no    Does the patient take Coumadin, Lovenox, Plavix, Elliquis, Xarelto, or other blood thinning medication? no    Has the patient had a stroke, cardiac event, or stent placed in the past year? no      If patient is between 45yrs - 49yrs, please advise patient that we will have to confirm benefits & coverage with their insurance company for a routine screening colonoscopy.

## 2024-10-24 NOTE — TELEPHONE ENCOUNTER
Scheduled date of colonoscopy (as of today):12/5/2024  Physician performing colonoscopy:Dr. Haji  Location of colonoscopy:[facility]  Bowel prep reviewed with patient:TBD  Instructions reviewed with patient by:CB- Once prep has been confirmed please send prep to [email or web address]   Clearances: n/a

## 2024-10-24 NOTE — TELEPHONE ENCOUNTER
Patients GI provider:  Dr. Haji    Number to return call: [phone]    Reason for call: Patient called in to schedule a colonoscopy. Patient is requesting sutab for prep due to patient not able to consume lots of liquid. Patient is requesting for prep to be sent to the [facility] pharmacy listed on chart  located on [address]. Prep instructions can be sent to [email or web address], thank you.    Scheduled procedure/appointment date if applicable: Apt/procedure 12/5/2024

## 2024-10-24 NOTE — TELEPHONE ENCOUNTER
Prescription for Sutab sent to patient's pharmacy and instructions emailed to [email or web address].

## 2024-11-21 ENCOUNTER — ANESTHESIA (OUTPATIENT)
Dept: ANESTHESIOLOGY | Facility: AMBULATORY SURGERY CENTER | Age: 69
End: 2024-11-21

## 2024-11-21 ENCOUNTER — ANESTHESIA EVENT (OUTPATIENT)
Dept: ANESTHESIOLOGY | Facility: AMBULATORY SURGERY CENTER | Age: 69
End: 2024-11-21

## 2024-11-21 ENCOUNTER — TELEPHONE (OUTPATIENT)
Dept: GASTROENTEROLOGY | Facility: CLINIC | Age: 69
End: 2024-11-21

## 2024-12-05 ENCOUNTER — ANESTHESIA EVENT (OUTPATIENT)
Dept: GASTROENTEROLOGY | Facility: AMBULATORY SURGERY CENTER | Age: 69
End: 2024-12-05

## 2024-12-05 ENCOUNTER — HOSPITAL ENCOUNTER (OUTPATIENT)
Dept: GASTROENTEROLOGY | Facility: AMBULATORY SURGERY CENTER | Age: 69
Discharge: HOME | End: 2024-12-05
Payer: COMMERCIAL

## 2024-12-05 ENCOUNTER — ANESTHESIA (OUTPATIENT)
Dept: GASTROENTEROLOGY | Facility: AMBULATORY SURGERY CENTER | Age: 69
End: 2024-12-05

## 2024-12-05 VITALS
RESPIRATION RATE: 17 BRPM | HEIGHT: 60 IN | SYSTOLIC BLOOD PRESSURE: 130 MMHG | OXYGEN SATURATION: 100 % | HEART RATE: 60 BPM | TEMPERATURE: 97.8 F | DIASTOLIC BLOOD PRESSURE: 58 MMHG | WEIGHT: 146 LBS | BODY MASS INDEX: 28.66 KG/M2

## 2024-12-05 DIAGNOSIS — Z12.11 SCREENING FOR COLON CANCER: ICD-10-CM

## 2024-12-05 PROCEDURE — 88305 TISSUE EXAM BY PATHOLOGIST: CPT | Performed by: PATHOLOGY

## 2024-12-05 PROCEDURE — 45385 COLONOSCOPY W/LESION REMOVAL: CPT | Performed by: INTERNAL MEDICINE

## 2024-12-05 RX ORDER — SODIUM CHLORIDE, SODIUM LACTATE, POTASSIUM CHLORIDE, CALCIUM CHLORIDE 600; 310; 30; 20 MG/100ML; MG/100ML; MG/100ML; MG/100ML
75 INJECTION, SOLUTION INTRAVENOUS CONTINUOUS
Status: DISCONTINUED | OUTPATIENT
Start: 2024-12-05 | End: 2024-12-09 | Stop reason: HOSPADM

## 2024-12-05 RX ORDER — PROPOFOL 10 MG/ML
INJECTION, EMULSION INTRAVENOUS AS NEEDED
Status: DISCONTINUED | OUTPATIENT
Start: 2024-12-05 | End: 2024-12-05

## 2024-12-05 RX ADMIN — PROPOFOL 50 MG: 10 INJECTION, EMULSION INTRAVENOUS at 07:44

## 2024-12-05 RX ADMIN — PROPOFOL 50 MG: 10 INJECTION, EMULSION INTRAVENOUS at 07:40

## 2024-12-05 RX ADMIN — PROPOFOL 100 MG: 10 INJECTION, EMULSION INTRAVENOUS at 07:30

## 2024-12-05 RX ADMIN — SODIUM CHLORIDE, SODIUM LACTATE, POTASSIUM CHLORIDE, CALCIUM CHLORIDE 75 ML/HR: 600; 310; 30; 20 INJECTION, SOLUTION INTRAVENOUS at 07:13

## 2024-12-05 RX ADMIN — PROPOFOL 50 MG: 10 INJECTION, EMULSION INTRAVENOUS at 07:33

## 2024-12-05 RX ADMIN — PROPOFOL 50 MG: 10 INJECTION, EMULSION INTRAVENOUS at 07:37

## 2024-12-05 NOTE — H&P
History and Physical - [facility]  Silvia Flores 69 y.o. female MRN: [id number]    HPI: Silvia Florse is a 69 y.o. year old female who presents for colonoscopy secondary to brother with colon polyps    REVIEW OF SYSTEMS: Per the HPI, and otherwise unremarkable.    Historical Information   Past Medical History:   Diagnosis Date    Colon polyp      Past Surgical History:   Procedure Laterality Date     SECTION      CHOLECYSTECTOMY      COLONOSCOPY  2014    COLONOSCOPY      CYST REMOVAL      back of the head by dermatology    DILATION AND CURETTAGE OF UTERUS      NE HYSTEROSCOPY BX ENDOMETRIUM&/POLYPC W/WO D&C N/A 2020    Procedure: DILATATION AND CURETTAGE (D&C) WITH HYSTEROSCOPY;  Surgeon: Ez Allen MD;  Location:  MAIN OR;  Service: Gynecology    TUBAL LIGATION       Social History   Social History     Substance and Sexual Activity   Alcohol Use Yes    Comment: 1-2 glasses per wine     Social History     Substance and Sexual Activity   Drug Use Never     Social History     Tobacco Use   Smoking Status Former    Types: Cigarettes   Smokeless Tobacco Never   Tobacco Comments    quit 1977     Family History   Problem Relation Age of Onset    Hypertension Mother     Heart disease Mother     Thyroid disease Mother     Liver disease Mother     Cancer Father         lung cancer    Colon polyps Brother     No Known Problems Daughter     No Known Problems Son     No Known Problems Son     No Known Problems Maternal Grandmother     No Known Problems Maternal Grandfather     No Known Problems Paternal Grandmother     No Known Problems Paternal Grandfather     No Known Problems Maternal Aunt     No Known Problems Maternal Aunt     No Known Problems Paternal Aunt        Meds/Allergies       Current Outpatient Medications:     Ascorbic Acid (VITAMIN C PO)    aspirin (ECOTRIN LOW STRENGTH) 81 mg EC tablet    Cholecalciferol (VITAMIN D3) 50 MCG (2000) capsule    Cyanocobalamin  (VITAMIN B12 PO)    fluticasone (FLONASE) 50 mcg/act nasal spray    Pyridoxine HCl (VITAMIN B6 PO)    CRANBERRY PO    Sodium Sulfate-Mag Sulfate-KCl 9408-457-924 MG TABS    Current Facility-Administered Medications:     lactated ringers infusion, 75 mL/hr, Intravenous, Continuous, Continue from Pre-op at 12/05/24 0716    No Known Allergies    Objective     /71   Pulse 62   Temp 97.8 °F (36.6 °C) (Temporal)   Resp 17   Ht 5' (1.524 m)   Wt 66.2 kg (146 lb)   SpO2 98%   BMI 28.51 kg/m²     PHYSICAL EXAM    Gen: NAD AAOx3  Head: Normocephalic, Atraumatic  CV: S1S2 RRR no m/r/g  CHEST: Clear b/l no c/r/w  ABD: soft, +BS NT/ND  EXT: no edema    ASSESSMENT/PLAN:  This is a 69 y.o. year old female here for colonoscopy secondary to family history of colon polyps, and she is stable and optimized for her procedure.

## 2024-12-05 NOTE — ANESTHESIA PREPROCEDURE EVALUATION
Procedure:  COLONOSCOPY    Relevant Problems   ANESTHESIA (within normal limits)      CARDIO (within normal limits)      PULMONARY (within normal limits)   (-) Sleep apnea   (-) Smoking   (-) URI (upper respiratory infection)      Physical Exam    Airway    Mallampati score: I  TM Distance: >3 FB  Neck ROM: full     Dental   No notable dental hx     Cardiovascular      Pulmonary      Other Findings  post-pubertal.      Anesthesia Plan  ASA Score- 1     Anesthesia Type- IV sedation with anesthesia with ASA Monitors.         Additional Monitors:     Airway Plan:            Plan Factors-Exercise tolerance (METS): >4 METS.    Chart reviewed.   Existing labs reviewed. Patient summary reviewed.    Patient is not a current smoker.              Induction- intravenous.    Postoperative Plan-         Informed Consent- Anesthetic plan and risks discussed with patient.  I personally reviewed this patient with the CRNA. Discussed and agreed on the Anesthesia Plan with the CRNA..

## 2024-12-05 NOTE — PROGRESS NOTES
Dr Haji reviewed results with pt and answered questions. Pt given written and verbal d/c instructions.

## 2024-12-06 ENCOUNTER — NURSE TRIAGE (OUTPATIENT)
Age: 69
End: 2024-12-06

## 2024-12-06 NOTE — TELEPHONE ENCOUNTER
"SPOKE WITH PT, COLONOSCOPY 12/5/24, ENDOCLIPS PLACED. PT WANTS TO KNOW HOW LONG THEY WILL BE IN PLACE, SCHEDULED FOR DEXASCAN IN 3 WEEKS. THANK YOU.        Answer Assessment - Initial Assessment Questions  1. REASON FOR CALL: \"What is the main reason for your call?\" or \"How can I best help you?\"        SPOKE WITH PT, COLONOSCOPY 12/5/24, ENDOCLIPS PLACED. PT WANTS TO KNOW HOW LONG THEY WILL BE IN PLACE, SCHEDULED FOR DEXASCAN IN 3 WEEKS. THANK YOU.    Protocols used: Information Only Call - No Triage-Adult-[place]    "

## 2024-12-12 PROCEDURE — 88305 TISSUE EXAM BY PATHOLOGIST: CPT | Performed by: PATHOLOGY

## 2024-12-16 ENCOUNTER — RESULTS FOLLOW-UP (OUTPATIENT)
Dept: GASTROENTEROLOGY | Facility: CLINIC | Age: 69
End: 2024-12-16

## 2024-12-31 ENCOUNTER — HOSPITAL ENCOUNTER (OUTPATIENT)
Dept: MAMMOGRAPHY | Facility: CLINIC | Age: 69
Discharge: HOME | End: 2024-12-31
Payer: COMMERCIAL

## 2024-12-31 ENCOUNTER — HOSPITAL ENCOUNTER (OUTPATIENT)
Dept: BONE DENSITY | Facility: CLINIC | Age: 69
Discharge: HOME | End: 2024-12-31
Payer: COMMERCIAL

## 2024-12-31 VITALS — WEIGHT: 146 LBS | HEIGHT: 60 IN | BODY MASS INDEX: 28.66 KG/M2

## 2024-12-31 VITALS — HEIGHT: 60 IN | BODY MASS INDEX: 28.66 KG/M2 | WEIGHT: 146 LBS

## 2024-12-31 DIAGNOSIS — Z12.31 ENCOUNTER FOR SCREENING MAMMOGRAM FOR BREAST CANCER: ICD-10-CM

## 2024-12-31 DIAGNOSIS — M85.852 OSTEOPENIA OF NECK OF LEFT FEMUR: ICD-10-CM

## 2024-12-31 PROCEDURE — 77063 BREAST TOMOSYNTHESIS BI: CPT

## 2024-12-31 PROCEDURE — 77080 DXA BONE DENSITY AXIAL: CPT

## 2024-12-31 PROCEDURE — 77067 SCR MAMMO BI INCL CAD: CPT

## 2025-01-06 ENCOUNTER — RESULTS FOLLOW-UP (OUTPATIENT)
Dept: GYNECOLOGY | Facility: CLINIC | Age: 70
End: 2025-01-06

## 2025-03-31 ENCOUNTER — ANNUAL EXAM (OUTPATIENT)
Dept: GYNECOLOGY | Facility: CLINIC | Age: 70
End: 2025-03-31
Payer: COMMERCIAL

## 2025-03-31 VITALS
BODY MASS INDEX: 28.27 KG/M2 | DIASTOLIC BLOOD PRESSURE: 84 MMHG | HEIGHT: 60 IN | WEIGHT: 144 LBS | SYSTOLIC BLOOD PRESSURE: 152 MMHG

## 2025-03-31 DIAGNOSIS — Z01.419 ENCOUNTER FOR ANNUAL ROUTINE GYNECOLOGICAL EXAMINATION: Primary | ICD-10-CM

## 2025-03-31 DIAGNOSIS — Z12.31 ENCOUNTER FOR SCREENING MAMMOGRAM FOR BREAST CANCER: ICD-10-CM

## 2025-03-31 PROCEDURE — 99397 PER PM REEVAL EST PAT 65+ YR: CPT | Performed by: OBSTETRICS & GYNECOLOGY

## 2025-03-31 RX ORDER — AMMONIUM LACTATE 12 G/100G
LOTION TOPICAL
COMMUNITY
Start: 2025-01-29

## 2025-03-31 RX ORDER — OLOPATADINE HYDROCHLORIDE 1 MG/ML
SOLUTION/ DROPS OPHTHALMIC EVERY 12 HOURS
COMMUNITY

## 2025-03-31 RX ORDER — CEFUROXIME AXETIL 500 MG/1
TABLET ORAL EVERY 12 HOURS
COMMUNITY
Start: 2025-03-24

## 2025-03-31 NOTE — PROGRESS NOTES
Name: Silvia Flores      : 1955      MRN: [id number]  Encounter Provider: Gricelda Márquez DO  Encounter Date: 3/31/2025   Encounter department: [facility]  :  Assessment & Plan    pap is up to date, reviewed guidelines.  It seems that her LEEP was done for mild dysplasia and seems to be 20 or 25 years ago.  We can discuss next year.    mammogram reviewed with her including breast density.  RX given for December.   Discussed self breast exams    colon cancer screening - colonoscopy in , recall in 5 years    History of UTI and vaginal atrophy-we discussed use of a small amount of vaginal estrogen but she does not want to do this due to the history of DVT.  I did explain that the risk would be very small especially if she applied it topically to the introitus and urethra.   I also recommended a vaginal moisturizer or coconut oil as this will help the tissue retain moisture.  She was given a list and she will give this a try.  I explained the coconut oil can be used as a lubricant for intercourse as well.    Osteopenia-she walks regularly and we discussed adding strength training.    discussed preventive care, regular exercise and a healthy diet      History of Present Illness   HPI  Silvia Flores is a 69 y.o. female who presents for yearly.  She had a UTI last , took Bactrim x 2.  She continued to have issues until November.  She then took another antibiotic which did not work and then cipro.  She saw urology at [place] and had a cystoscopy who stated she had inflammation.  She was having pain    She had DVTs in her leg in .  Felt to be from a leg injury.  She was anticoagulated for 6 months.    Normal 3D mammogram in December with fatty tissue and average risk  DEXA in  with osteopenia at all three sites, she did not meet treatment criteria  Normal pap, negative HPV in .  She states that she had a LEEP in her 40s or 50s.  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  It was treated because she was finished with childbearing and her doctor offered it.  Her Paps have been normal since then      Review of Systems   Constitutional: Negative.    Gastrointestinal: Negative.    Genitourinary: Negative.           Objective   There were no vitals taken for this visit.     Physical Exam  Vitals and nursing note reviewed. Exam conducted with a chaperone present.   Constitutional:       Appearance: She is well-developed.   HENT:      Head: Normocephalic and atraumatic.   Eyes:      Conjunctiva/sclera: Conjunctivae normal.   Neck:      Thyroid: No thyromegaly.   Cardiovascular:      Rate and Rhythm: Normal rate and regular rhythm.   Pulmonary:      Effort: Pulmonary effort is normal.      Breath sounds: Normal breath sounds.   Chest:   Breasts:     Right: Normal.      Left: Normal.      Comments: Examined seated and supine  Abdominal:      Palpations: Abdomen is soft.   Genitourinary:     General: Normal vulva.      Vagina: Normal.      Cervix: Normal.      Uterus: Normal.       Adnexa: Right adnexa normal and left adnexa normal.      Rectum: Normal.   Musculoskeletal:      Cervical back: Neck supple.   Skin:     General: Skin is warm and dry.   Neurological:      Mental Status: She is alert.   Psychiatric:         Mood and Affect: Mood normal.

## (undated) DEVICE — SCD SEQUENTIAL COMPRESSION COMFORT SLEEVE MEDIUM KNEE LENGTH: Brand: KENDALL SCD

## (undated) DEVICE — 2000CC GUARDIAN II: Brand: GUARDIAN

## (undated) DEVICE — GLOVE SRG LF STRL BGL SKNSNS 7.5 PF

## (undated) DEVICE — STRAIGHT CATH RED RUBBER 12FR

## (undated) DEVICE — CYSTO TUBING SINGLE IRRIGATION

## (undated) DEVICE — IV EXTENSION TUBING 33 IN

## (undated) DEVICE — SPONGE STICK WITH PVP-I: Brand: KENDALL

## (undated) DEVICE — STRL ALLENTOWN HYSTEROSCOPY PK: Brand: CARDINAL HEALTH